# Patient Record
(demographics unavailable — no encounter records)

---

## 2024-10-14 NOTE — REVIEW OF SYSTEMS
[Fever] : no fever [Chills] : chills [Fatigue] : fatigue [Vision Problems] : vision problems [Hearing Loss] : no hearing loss [Chest Pain] : no chest pain [Lower Ext Edema] : no lower extremity edema [Shortness Of Breath] : no shortness of breath [Cough] : cough [Abdominal Pain] : no abdominal pain [Nausea] : no nausea [Constipation] : no constipation [Vomiting] : no vomiting [Incontinence] : incontinence [Muscle Weakness] : muscle weakness [Unsteady Walking] : ataxia [Insomnia] : insomnia [Negative] : Heme/Lymph [FreeTextEntry3] : LT eye no vision
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
## 2024-10-14 NOTE — PHYSICAL EXAM
[No Acute Distress] : no acute distress [Normal Sclera/Conjunctiva] : normal sclera/conjunctiva [Normal Outer Ear/Nose] : the outer ears and nose were normal in appearance [No Respiratory Distress] : no respiratory distress  [No Accessory Muscle Use] : no accessory muscle use [No CVA Tenderness] : no CVA  tenderness [No Rash] : no rash [No Skin Lesions] : no skin lesions [Normal] : affect was normal and insight and judgment were intact
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
## 2024-11-01 NOTE — PLAN
[FreeTextEntry1] : 1. cont medication as prescribed 2. pt referred to hospice as requested 3. eat small portions of food

## 2024-11-01 NOTE — HISTORY OF PRESENT ILLNESS
[Home] : at home, [unfilled] , at the time of the visit. [Other Location: e.g. Home (Enter Location, City,State)___] : at [unfilled] [Verbal consent obtained from patient] : the patient, [unfilled] [FreeTextEntry1] : F/u post hospitalization at [facility] from 10/22-24 for Anemia [de-identified] : This patient is Enrolled in the Post-Discharge [facility] Care Services Follow Up Program through [facility] for Continuity of Care S/P Recent Hospitalization until seen by PCP. Brief Hospital Course copied: This is a 68 years old female with h/o HTN, multiple myeloma ( not on treatment, does not want treatment), hyponatremia, left eye blindness, admitted with acute on chronic anemia secondary to epistaxis. Hemodynamically stable, afebrile, sat well at RA. No leukocytosis. Hb 5.4 ( 7.9 in 9/25/24), plt 122, Na 124, Cr 1.26. CXR  ( I personally review) with no focal consolidation. Received 2 units of PRBC with appropriate response, no further episodes of bleeding. Prior to DC, patient is HD stable, afebrile, saturating well on RA, at baseline Hgb and stable s/p transfusion.  Televisit with pt and RN. Pt is alert and oriented x3 observe her lying in bed. Pt went back to the ER for bleeding had to get 2 units of pRBC. Reports pain to left foot. Pt has Lytic bone lesion of the femur. States she does not want chemo, radiation or immunotherapy. Weak and with pain. Pt reports decreased appetite has been losing weight, not ambulatory, skin is intact. She has decided not to get chemo or any other therapy.  Pt is now DNR, MOLST done in the hospital. Per pt's caregiver she has a provider who will be visiting on Monday.  NA is low at 129 mg pt can have regular foods she likes soft foods easy to swallow. Pt is refusing hospital bed; she has a bed that head and foot elevated. Instructed caregiver to get bed rails to prevent pt from falling off the bed. Also instructed caregiver that pt should not be left at home at nights, pt might need an aide, PERS, family or placement. Caregiver to discuss with pt and pt's Holiness .   TCM NP reviewed that pt is under the [facility] program for home care until pt is seen by PCP. TCM NP will be assigned to sign Home Care orders post-discharge  [Other:____] : [unfilled]
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
## 2024-11-01 NOTE — REVIEW OF SYSTEMS
[Chills] : chills [Fatigue] : fatigue [Vision Problems] : vision problems [Cough] : cough [Incontinence] : incontinence [Muscle Weakness] : muscle weakness [Unsteady Walking] : ataxia [Insomnia] : insomnia [Negative] : Heme/Lymph [Fever] : no fever [Hearing Loss] : no hearing loss [Chest Pain] : no chest pain [Shortness Of Breath] : no shortness of breath [Lower Ext Edema] : no lower extremity edema [Abdominal Pain] : no abdominal pain [Nausea] : no nausea [Constipation] : no constipation [Vomiting] : no vomiting [FreeTextEntry3] : LT eye no vision

## 2024-11-01 NOTE — HISTORY OF PRESENT ILLNESS
[Home] : at home, [unfilled] , at the time of the visit. [Other Location: e.g. Home (Enter Location, City,State)___] : at [unfilled] [Verbal consent obtained from patient] : the patient, [unfilled] [FreeTextEntry1] : F/u post hospitalization at [facility] from 10/22-24 for Anemia [de-identified] : This patient is Enrolled in the Post-Discharge [facility] Home Care Services Follow Up Program through [facility] for Continuity of Care S/P Recent Hospitalization until seen by PCP. Brief Hospital Course copied: This is a 68 years old female with h/o HTN, multiple myeloma ( not on treatment, does not want treatment), hyponatremia, left eye blindness, admitted with acute on chronic anemia secondary to epistaxis. Hemodynamically stable, afebrile, sat well at RA. No leukocytosis. Hb 5.4 ( 7.9 in 9/25/24), plt 122, Na 124, Cr 1.26. CXR  ( I personally review) with no focal consolidation. Received 2 units of PRBC with appropriate response, no further episodes of bleeding. Prior to DC, patient is HD stable, afebrile, saturating well on RA, at baseline Hgb and stable s/p transfusion.  Televisit with pt and RN. Pt is alert and oriented x3 observe her lying in bed. Pt went back to the ER for bleeding had to get 2 units of pRBC. Reports pain to left foot. Pt has Lytic bone lesion of the femur. States she does not want chemo, radiation or immunotherapy. Weak and with pain. Pt reports decreased appetite has been losing weight, not ambulatory, skin is intact. She has decided not to get chemo or any other therapy.  Pt is now DNR, MOLST done in the hospital. Per pt's caregiver she has a provider who will be visiting on Monday.  NA is low at 129 mg pt can have regular foods she likes soft foods easy to swallow. Pt is refusing hospital bed; she has a bed that head and foot elevated. Instructed caregiver to get bed rails to prevent pt from falling off the bed. Also instructed caregiver that pt should not be left at home at nights, pt might need an aide, PERS, family or placement. Caregiver to discuss with pt and pt's [place]ze .   TCM NP reviewed that pt is under the [facility] program for home care until pt is seen by PCP. TCM NP will be assigned to sign Home Care orders post-discharge  [Other:____] : [unfilled]

## 2024-11-01 NOTE — REVIEW OF SYSTEMS
[Chills] : chills [Fatigue] : fatigue [Vision Problems] : vision problems [Cough] : cough [Incontinence] : incontinence [Muscle Weakness] : muscle weakness [Unsteady Walking] : ataxia [Insomnia] : insomnia [Negative] : Heme/Lymph [Fever] : no fever [Chest Pain] : no chest pain [Hearing Loss] : no hearing loss [Lower Ext Edema] : no lower extremity edema [Shortness Of Breath] : no shortness of breath [Abdominal Pain] : no abdominal pain [Nausea] : no nausea [Constipation] : no constipation [Vomiting] : no vomiting [FreeTextEntry3] : LT eye no vision